# Patient Record
Sex: MALE | Race: BLACK OR AFRICAN AMERICAN | NOT HISPANIC OR LATINO | URBAN - METROPOLITAN AREA
[De-identification: names, ages, dates, MRNs, and addresses within clinical notes are randomized per-mention and may not be internally consistent; named-entity substitution may affect disease eponyms.]

---

## 2023-06-09 ENCOUNTER — HOSPITAL ENCOUNTER (OUTPATIENT)
Dept: RADIOLOGY | Facility: HOSPITAL | Age: 88
Discharge: HOME | End: 2023-06-09
Attending: FAMILY MEDICINE
Payer: MEDICARE

## 2023-06-09 ENCOUNTER — OFFICE VISIT (OUTPATIENT)
Dept: FAMILY MEDICINE | Facility: CLINIC | Age: 88
End: 2023-06-09
Payer: MEDICARE

## 2023-06-09 VITALS
DIASTOLIC BLOOD PRESSURE: 82 MMHG | SYSTOLIC BLOOD PRESSURE: 174 MMHG | RESPIRATION RATE: 18 BRPM | OXYGEN SATURATION: 97 % | HEART RATE: 83 BPM | BODY MASS INDEX: 19.17 KG/M2 | TEMPERATURE: 98 F | HEIGHT: 69 IN | WEIGHT: 129.44 LBS
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
## 2023-06-09 DIAGNOSIS — J43.2 CENTRILOBULAR EMPHYSEMA: ICD-10-CM

## 2023-06-09 DIAGNOSIS — M79.661 PAIN IN RIGHT LOWER LEG: ICD-10-CM

## 2023-06-09 DIAGNOSIS — I10 HYPERTENSION, UNSPECIFIED TYPE: Primary | ICD-10-CM

## 2023-06-09 PROCEDURE — 73590 X-RAY EXAM OF LOWER LEG: CPT | Mod: 26,RT,, | Performed by: RADIOLOGY

## 2023-06-09 PROCEDURE — 99204 PR OFFICE/OUTPT VISIT, NEW, LEVL IV, 45-59 MIN: ICD-10-PCS | Mod: S$GLB,,, | Performed by: FAMILY MEDICINE

## 2023-06-09 PROCEDURE — 1159F PR MEDICATION LIST DOCUMENTED IN MEDICAL RECORD: ICD-10-PCS | Mod: CPTII,S$GLB,, | Performed by: FAMILY MEDICINE

## 2023-06-09 PROCEDURE — 73590 X-RAY EXAM OF LOWER LEG: CPT | Mod: TC,FY,PO,RT

## 2023-06-09 PROCEDURE — 1126F PR PAIN SEVERITY QUANTIFIED, NO PAIN PRESENT: ICD-10-PCS | Mod: CPTII,S$GLB,, | Performed by: FAMILY MEDICINE

## 2023-06-09 PROCEDURE — 1159F MED LIST DOCD IN RCRD: CPT | Mod: CPTII,S$GLB,, | Performed by: FAMILY MEDICINE

## 2023-06-09 PROCEDURE — 99999 PR PBB SHADOW E&M-EST. PATIENT-LVL IV: CPT | Mod: PBBFAC,,, | Performed by: FAMILY MEDICINE

## 2023-06-09 PROCEDURE — 73590 XR TIBIA FIBULA 2 VIEW RIGHT: ICD-10-PCS | Mod: 26,RT,, | Performed by: RADIOLOGY

## 2023-06-09 PROCEDURE — 3288F FALL RISK ASSESSMENT DOCD: CPT | Mod: CPTII,S$GLB,, | Performed by: FAMILY MEDICINE

## 2023-06-09 PROCEDURE — 1100F PR PT FALLS ASSESS DOC 2+ FALLS/FALL W/INJURY/YR: ICD-10-PCS | Mod: CPTII,S$GLB,, | Performed by: FAMILY MEDICINE

## 2023-06-09 PROCEDURE — 99204 OFFICE O/P NEW MOD 45 MIN: CPT | Mod: S$GLB,,, | Performed by: FAMILY MEDICINE

## 2023-06-09 PROCEDURE — 99999 PR PBB SHADOW E&M-EST. PATIENT-LVL IV: ICD-10-PCS | Mod: PBBFAC,,, | Performed by: FAMILY MEDICINE

## 2023-06-09 PROCEDURE — 1126F AMNT PAIN NOTED NONE PRSNT: CPT | Mod: CPTII,S$GLB,, | Performed by: FAMILY MEDICINE

## 2023-06-09 PROCEDURE — 3288F PR FALLS RISK ASSESSMENT DOCUMENTED: ICD-10-PCS | Mod: CPTII,S$GLB,, | Performed by: FAMILY MEDICINE

## 2023-06-09 PROCEDURE — 1100F PTFALLS ASSESS-DOCD GE2>/YR: CPT | Mod: CPTII,S$GLB,, | Performed by: FAMILY MEDICINE

## 2023-06-09 PROCEDURE — 1160F PR REVIEW ALL MEDS BY PRESCRIBER/CLIN PHARMACIST DOCUMENTED: ICD-10-PCS | Mod: CPTII,S$GLB,, | Performed by: FAMILY MEDICINE

## 2023-06-09 PROCEDURE — 1160F RVW MEDS BY RX/DR IN RCRD: CPT | Mod: CPTII,S$GLB,, | Performed by: FAMILY MEDICINE

## 2023-06-09 RX ORDER — LEVOFLOXACIN 750 MG/1
750 TABLET ORAL
COMMUNITY
Start: 2023-01-26 | End: 2023-06-09

## 2023-06-09 RX ORDER — CYPROHEPTADINE HYDROCHLORIDE 4 MG/1
4 TABLET ORAL NIGHTLY
COMMUNITY
Start: 2023-05-23

## 2023-06-09 RX ORDER — IPRATROPIUM BROMIDE 0.5 MG/2.5ML
SOLUTION RESPIRATORY (INHALATION)
COMMUNITY
Start: 2023-04-18

## 2023-06-09 RX ORDER — PANTOPRAZOLE SODIUM 40 MG/1
40 TABLET, DELAYED RELEASE ORAL
COMMUNITY
Start: 2023-05-23

## 2023-06-09 RX ORDER — AMLODIPINE BESYLATE 5 MG/1
5 TABLET ORAL
COMMUNITY
End: 2023-10-06

## 2023-06-09 RX ORDER — ALBUTEROL SULFATE 90 UG/1
2 AEROSOL, METERED RESPIRATORY (INHALATION) EVERY 6 HOURS PRN
COMMUNITY
Start: 2023-04-18

## 2023-06-09 RX ORDER — FLUTICASONE FUROATE, UMECLIDINIUM BROMIDE AND VILANTEROL TRIFENATATE 100; 62.5; 25 UG/1; UG/1; UG/1
1 POWDER RESPIRATORY (INHALATION)
COMMUNITY
Start: 2023-06-05

## 2023-06-09 NOTE — PROGRESS NOTES
Tony Carlos    Chief Complaint   Patient presents with    Establish Care       History of Present Illness:   Mr. Carlos comes in today as a new patient to establish care with me.  Is accompanied by his wife Annie Carlos who assists him with giving information.  He states he has been previously followed by PCP Dr. Chsaity Hawley with last visit approximately 3 months ago.    He states he is not fasting but has taken medications today.  He states he rarely performs home blood pressure checks and notes blood pressure was elevated when last checked.  He states he does not exercise.  He saw Dr. Yang Araya, cardiologist, on January 24, 2023 for surveillance of hypertension, hyperlipidemia, central lobular emphysema with 1-year follow-up advised.      He complains of having chronic shortness of breath.  He saw NP Idalia Godinez with [facility] pulmonology on August 26, 2022 for shortness of breath, central lobular emphysema.  He states he quit smoking cigarettes in 2018.    His wife states he currently uses a friend's walker but requests his own Rollator.  She states he fell 3 weeks ago from a stool as he got up too quickly.  She states he fell onto his right leg (knee) and twisted his right ankle as he tried to move a stool.  He states he did not hit his head.  She states she gave him Biofreeze to applied to his knee and ankle which he only used once.  She states he sits a lot.    He reports no known history of kidney or liver disease.    Otherwise, he denies having fever, chills, fatigue, appetite changes; cough, wheezing; chest pain, palpitations, leg swelling; abdominal pain, nausea, vomiting, diarrhea, constipation; unusual urinary symptoms; polydipsia, polyphagia, polyuria, hot or cold intolerance; back pain; acute visual  changes, numbness, headache; anxiety, depression, homicidal or suicidal thoughts.          Past Medical History:   Diagnosis Date    Centrilobular emphysema     GERD (gastroesophageal  reflux disease)     Hypertension     Mixed hyperlipidemia         Current Outpatient Medications   Medication Sig    albuterol (PROVENTIL/VENTOLIN HFA) 90 mcg/actuation inhaler Inhale 2 puffs into the lungs every 6 (six) hours as needed.    amLODIPine (NORVASC) 5 MG tablet Take 5 mg by mouth.    cyproheptadine (PERIACTIN) 4 mg tablet Take 4 mg by mouth every evening.    ipratropium (ATROVENT) 0.02 % nebulizer solution Take by nebulization.    pantoprazole (PROTONIX) 40 MG tablet Take 40 mg by mouth.    TRELEGY ELLIPTA 100-62.5-25 mcg DsDv Inhale 1 puff into the lungs.       Review of Systems   Constitutional:  Negative for activity change, appetite change, chills, fatigue and fever.   Eyes:  Negative for visual disturbance.   Respiratory:  Positive for shortness of breath. Negative for cough and wheezing.    Cardiovascular:  Negative for chest pain, palpitations and leg swelling.   Gastrointestinal:  Negative for abdominal pain, constipation, diarrhea, nausea and vomiting.   Endocrine: Negative for cold intolerance, heat intolerance, polydipsia, polyphagia and polyuria.   Genitourinary:  Negative for difficulty urinating.   Musculoskeletal:  Positive for myalgias. Negative for arthralgias and back pain.   Neurological:  Negative for numbness and headaches.   Psychiatric/Behavioral:  Negative for dysphoric mood and suicidal ideas. The patient is not nervous/anxious.         Negative for homicidal ideas.     Objective:  Physical Exam  Vitals reviewed.   Constitutional:       General: He is not in acute distress.     Appearance: Normal appearance. He is not ill-appearing, toxic-appearing or diaphoretic.      Comments: Pleasant.   Cardiovascular:      Rate and Rhythm: Normal rate and regular rhythm.      Pulses: Normal pulses.      Heart sounds: No murmur heard.  Pulmonary:      Effort: Pulmonary effort is normal. No respiratory distress.      Breath sounds: Normal breath sounds. No wheezing.   Abdominal:      General:  Bowel sounds are normal. There is no distension.      Palpations: Abdomen is soft. There is no mass.      Tenderness: There is no abdominal tenderness. There is no guarding or rebound.   Musculoskeletal:         General: Tenderness present. No swelling. Normal range of motion.      Cervical back: Normal range of motion and neck supple. No tenderness.      Comments: He is ambulatory with slower pace noted. Tender at right lower leg without swelling, redness, warmth with full range of motion noted.   Lymphadenopathy:      Cervical: No cervical adenopathy.   Skin:     General: Skin is warm.   Neurological:      General: No focal deficit present.      Mental Status: He is alert and oriented to person, place, and time.   Psychiatric:         Mood and Affect: Mood normal.         Behavior: Behavior normal.         Thought Content: Thought content normal.         Judgment: Judgment normal.       ASSESSMENT:  1. Hypertension, unspecified type    2. Pain in right lower leg    3. Centrilobular emphysema    4. Fall, initial encounter        PLAN:  Tony was seen today for establish care.    Diagnoses and all orders for this visit:    Hypertension, unspecified type  -     Comprehensive Metabolic Panel; Future    Pain in right lower leg  -     WALKER FOR HOME USE  -     X-Ray Tibia Fibula 2 View Right; Future    Centrilobular emphysema  -     WALKER FOR HOME USE    Fall, initial encounter  -     X-Ray Tibia Fibula 2 View Right; Future      Patient advised to call for results.  Continue current medications, follow low sodium, low cholesterol, low carb diet, daily walks.  Okay to take over-the-counter extra strength Tylenol 1 to 2 pills twice daily if needed for pain.  Keep follow up with specialists.  Flu shot this fall.  Follow up in about 17 weeks (around 10/6/2023) for hypertension follow up. But, see me sooner if no improvement or worsening symptoms noted.    45 minutes of total time spent on the encounter, which includes  face to face time and non-face to face time preparing to see the patient (eg, review of tests), Obtaining and/or reviewing separately obtained history, Documenting clinical information in the electronic or other health record, Independently interpreting results (not separately reported) and communicating results to the patient/family/caregiver, or Care coordination (not separately reported).      This note is  generated with speech recognition software and is subject to transcription error and sound alike phrases that may be missed by proofreading.

## 2023-06-09 NOTE — PATIENT INSTRUCTIONS
Please call Dr. Maria for your results.    Okay to take over-the-counter extra strength Tylenol 1 to 2 pills twice daily if needed for pain.    Thanks.

## 2023-06-11 PROBLEM — J43.2 CENTRILOBULAR EMPHYSEMA: Status: ACTIVE | Noted: 2023-06-11

## 2023-06-11 PROBLEM — I10 HYPERTENSION: Status: ACTIVE | Noted: 2023-06-11

## 2023-06-13 ENCOUNTER — TELEPHONE (OUTPATIENT)
Dept: FAMILY MEDICINE | Facility: CLINIC | Age: 88
End: 2023-06-13
Payer: MEDICARE

## 2023-06-13 NOTE — TELEPHONE ENCOUNTER
Lab results are okay except low albumin which is related to his nutritional status. Make sure eating. Thanks for call.

## 2023-06-13 NOTE — TELEPHONE ENCOUNTER
----- Message from Jaspreet Wade sent at 6/13/2023 10:50 AM CDT -----  Contact: Annie/Spouse  Annie is needing a call back in regards to the patients lab results. Please give her a call back at [phone]

## 2023-10-06 ENCOUNTER — OFFICE VISIT (OUTPATIENT)
Dept: FAMILY MEDICINE | Facility: CLINIC | Age: 88
End: 2023-10-06
Attending: FAMILY MEDICINE
Payer: MEDICARE

## 2023-10-06 ENCOUNTER — LAB VISIT (OUTPATIENT)
Dept: LAB | Facility: HOSPITAL | Age: 88
End: 2023-10-06
Attending: FAMILY MEDICINE
Payer: MEDICARE

## 2023-10-06 VITALS
SYSTOLIC BLOOD PRESSURE: 128 MMHG | OXYGEN SATURATION: 96 % | WEIGHT: 127.19 LBS | DIASTOLIC BLOOD PRESSURE: 74 MMHG | RESPIRATION RATE: 18 BRPM | HEIGHT: 69 IN | BODY MASS INDEX: 18.84 KG/M2 | HEART RATE: 91 BPM | TEMPERATURE: 97 F

## 2023-10-06 DIAGNOSIS — I10 HYPERTENSION, UNSPECIFIED TYPE: Primary | ICD-10-CM

## 2023-10-06 DIAGNOSIS — J43.2 CENTRILOBULAR EMPHYSEMA: ICD-10-CM

## 2023-10-06 DIAGNOSIS — I10 HYPERTENSION, UNSPECIFIED TYPE: ICD-10-CM

## 2023-10-06 LAB
ALBUMIN SERPL BCP-MCNC: 3.1 G/DL (ref 3.5–5.2)
ALP SERPL-CCNC: 71 U/L (ref 55–135)
ALT SERPL W/O P-5'-P-CCNC: 9 U/L (ref 10–44)
ANION GAP SERPL CALC-SCNC: 9 MMOL/L (ref 8–16)
AST SERPL-CCNC: 20 U/L (ref 10–40)
BACTERIA #/AREA URNS AUTO: ABNORMAL /HPF
BASOPHILS # BLD AUTO: 0.05 K/UL (ref 0–0.2)
BASOPHILS NFR BLD: 0.9 % (ref 0–1.9)
BILIRUB SERPL-MCNC: 0.3 MG/DL (ref 0.1–1)
BILIRUB UR QL STRIP: NEGATIVE
BUN SERPL-MCNC: 16 MG/DL (ref 8–23)
CALCIUM SERPL-MCNC: 9.4 MG/DL (ref 8.7–10.5)
CHLORIDE SERPL-SCNC: 108 MMOL/L (ref 95–110)
CHOLEST SERPL-MCNC: 175 MG/DL (ref 120–199)
CHOLEST/HDLC SERPL: 3 {RATIO} (ref 2–5)
CLARITY UR REFRACT.AUTO: ABNORMAL
CO2 SERPL-SCNC: 27 MMOL/L (ref 23–29)
COLOR UR AUTO: YELLOW
CREAT SERPL-MCNC: 0.9 MG/DL (ref 0.5–1.4)
DIFFERENTIAL METHOD: ABNORMAL
EOSINOPHIL # BLD AUTO: 0.4 K/UL (ref 0–0.5)
EOSINOPHIL NFR BLD: 7.4 % (ref 0–8)
ERYTHROCYTE [DISTWIDTH] IN BLOOD BY AUTOMATED COUNT: 13.7 % (ref 11.5–14.5)
EST. GFR  (NO RACE VARIABLE): >60 ML/MIN/1.73 M^2
GLUCOSE SERPL-MCNC: 59 MG/DL (ref 70–110)
GLUCOSE UR QL STRIP: NEGATIVE
HCT VFR BLD AUTO: 45.1 % (ref 40–54)
HDLC SERPL-MCNC: 59 MG/DL (ref 40–75)
HDLC SERPL: 33.7 % (ref 20–50)
HGB BLD-MCNC: 13.6 G/DL (ref 14–18)
HGB UR QL STRIP: NEGATIVE
HYALINE CASTS UR QL AUTO: 2 /LPF
IMM GRANULOCYTES # BLD AUTO: 0.01 K/UL (ref 0–0.04)
IMM GRANULOCYTES NFR BLD AUTO: 0.2 % (ref 0–0.5)
KETONES UR QL STRIP: ABNORMAL
LDLC SERPL CALC-MCNC: 106.4 MG/DL (ref 63–159)
LEUKOCYTE ESTERASE UR QL STRIP: ABNORMAL
LYMPHOCYTES # BLD AUTO: 2.4 K/UL (ref 1–4.8)
LYMPHOCYTES NFR BLD: 40.9 % (ref 18–48)
MCH RBC QN AUTO: 28.5 PG (ref 27–31)
MCHC RBC AUTO-ENTMCNC: 30.2 G/DL (ref 32–36)
MCV RBC AUTO: 94 FL (ref 82–98)
MICROSCOPIC COMMENT: ABNORMAL
MONOCYTES # BLD AUTO: 0.7 K/UL (ref 0.3–1)
MONOCYTES NFR BLD: 12.7 % (ref 4–15)
NEUTROPHILS # BLD AUTO: 2.2 K/UL (ref 1.8–7.7)
NEUTROPHILS NFR BLD: 37.9 % (ref 38–73)
NITRITE UR QL STRIP: NEGATIVE
NONHDLC SERPL-MCNC: 116 MG/DL
NRBC BLD-RTO: 0 /100 WBC
PH UR STRIP: 5 [PH] (ref 5–8)
PLATELET # BLD AUTO: 292 K/UL (ref 150–450)
PMV BLD AUTO: 10.5 FL (ref 9.2–12.9)
POTASSIUM SERPL-SCNC: 3.8 MMOL/L (ref 3.5–5.1)
PROT SERPL-MCNC: 6.8 G/DL (ref 6–8.4)
PROT UR QL STRIP: ABNORMAL
RBC # BLD AUTO: 4.78 M/UL (ref 4.6–6.2)
RBC #/AREA URNS AUTO: 3 /HPF (ref 0–4)
SODIUM SERPL-SCNC: 144 MMOL/L (ref 136–145)
SP GR UR STRIP: 1.02 (ref 1–1.03)
SQUAMOUS #/AREA URNS AUTO: 1 /HPF
TRIGL SERPL-MCNC: 48 MG/DL (ref 30–150)
TSH SERPL DL<=0.005 MIU/L-ACNC: 1.29 UIU/ML (ref 0.4–4)
URN SPEC COLLECT METH UR: ABNORMAL
WBC # BLD AUTO: 5.84 K/UL (ref 3.9–12.7)
WBC #/AREA URNS AUTO: 77 /HPF (ref 0–5)

## 2023-10-06 PROCEDURE — 3288F PR FALLS RISK ASSESSMENT DOCUMENTED: ICD-10-PCS | Mod: CPTII,S$GLB,, | Performed by: FAMILY MEDICINE

## 2023-10-06 PROCEDURE — 1160F PR REVIEW ALL MEDS BY PRESCRIBER/CLIN PHARMACIST DOCUMENTED: ICD-10-PCS | Mod: CPTII,S$GLB,, | Performed by: FAMILY MEDICINE

## 2023-10-06 PROCEDURE — 99214 PR OFFICE/OUTPT VISIT, EST, LEVL IV, 30-39 MIN: ICD-10-PCS | Mod: 25,S$GLB,, | Performed by: FAMILY MEDICINE

## 2023-10-06 PROCEDURE — 1126F AMNT PAIN NOTED NONE PRSNT: CPT | Mod: CPTII,S$GLB,, | Performed by: FAMILY MEDICINE

## 2023-10-06 PROCEDURE — 1159F MED LIST DOCD IN RCRD: CPT | Mod: CPTII,S$GLB,, | Performed by: FAMILY MEDICINE

## 2023-10-06 PROCEDURE — 90694 VACC AIIV4 NO PRSRV 0.5ML IM: CPT | Mod: S$GLB,,, | Performed by: FAMILY MEDICINE

## 2023-10-06 PROCEDURE — 1160F RVW MEDS BY RX/DR IN RCRD: CPT | Mod: CPTII,S$GLB,, | Performed by: FAMILY MEDICINE

## 2023-10-06 PROCEDURE — 1101F PT FALLS ASSESS-DOCD LE1/YR: CPT | Mod: CPTII,S$GLB,, | Performed by: FAMILY MEDICINE

## 2023-10-06 PROCEDURE — G0008 FLU VACCINE - QUADRIVALENT - ADJUVANTED: ICD-10-PCS | Mod: S$GLB,,, | Performed by: FAMILY MEDICINE

## 2023-10-06 PROCEDURE — 1159F PR MEDICATION LIST DOCUMENTED IN MEDICAL RECORD: ICD-10-PCS | Mod: CPTII,S$GLB,, | Performed by: FAMILY MEDICINE

## 2023-10-06 PROCEDURE — 99999 PR PBB SHADOW E&M-EST. PATIENT-LVL IV: ICD-10-PCS | Mod: PBBFAC,,, | Performed by: FAMILY MEDICINE

## 2023-10-06 PROCEDURE — 36415 COLL VENOUS BLD VENIPUNCTURE: CPT | Mod: PO | Performed by: FAMILY MEDICINE

## 2023-10-06 PROCEDURE — 84443 ASSAY THYROID STIM HORMONE: CPT | Performed by: FAMILY MEDICINE

## 2023-10-06 PROCEDURE — 80053 COMPREHEN METABOLIC PANEL: CPT | Performed by: FAMILY MEDICINE

## 2023-10-06 PROCEDURE — 99999 PR PBB SHADOW E&M-EST. PATIENT-LVL IV: CPT | Mod: PBBFAC,,, | Performed by: FAMILY MEDICINE

## 2023-10-06 PROCEDURE — 3288F FALL RISK ASSESSMENT DOCD: CPT | Mod: CPTII,S$GLB,, | Performed by: FAMILY MEDICINE

## 2023-10-06 PROCEDURE — 1126F PR PAIN SEVERITY QUANTIFIED, NO PAIN PRESENT: ICD-10-PCS | Mod: CPTII,S$GLB,, | Performed by: FAMILY MEDICINE

## 2023-10-06 PROCEDURE — G0008 ADMIN INFLUENZA VIRUS VAC: HCPCS | Mod: S$GLB,,, | Performed by: FAMILY MEDICINE

## 2023-10-06 PROCEDURE — 1101F PR PT FALLS ASSESS DOC 0-1 FALLS W/OUT INJ PAST YR: ICD-10-PCS | Mod: CPTII,S$GLB,, | Performed by: FAMILY MEDICINE

## 2023-10-06 PROCEDURE — 85025 COMPLETE CBC W/AUTO DIFF WBC: CPT | Performed by: FAMILY MEDICINE

## 2023-10-06 PROCEDURE — 99214 OFFICE O/P EST MOD 30 MIN: CPT | Mod: 25,S$GLB,, | Performed by: FAMILY MEDICINE

## 2023-10-06 PROCEDURE — 80061 LIPID PANEL: CPT | Performed by: FAMILY MEDICINE

## 2023-10-06 PROCEDURE — 81001 URINALYSIS AUTO W/SCOPE: CPT | Performed by: FAMILY MEDICINE

## 2023-10-06 PROCEDURE — 90694 FLU VACCINE - QUADRIVALENT - ADJUVANTED: ICD-10-PCS | Mod: S$GLB,,, | Performed by: FAMILY MEDICINE

## 2023-10-06 RX ORDER — VALSARTAN 80 MG/1
80 TABLET ORAL
Status: CANCELLED | OUTPATIENT
Start: 2023-10-06

## 2023-10-06 RX ORDER — VALSARTAN 80 MG/1
80 TABLET ORAL DAILY
COMMUNITY
Start: 2023-08-13 | End: 2023-10-06 | Stop reason: SDUPTHER

## 2023-10-06 RX ORDER — VALSARTAN 80 MG/1
80 TABLET ORAL DAILY
Qty: 90 TABLET | Refills: 2 | Status: SHIPPED | OUTPATIENT
Start: 2023-10-06

## 2023-10-06 NOTE — PROGRESS NOTES
Tony DENISE Carlos    Chief Complaint   Patient presents with    Hypertension    Follow-up       History of Present Illness:   Mr. Carlos comes in today for hypertension follow up.  He is accompanied by his wife Annie Carlos who assists him with giving information.  He states he is not fasting but has taken blood pressure medication today.  He states he monitors his diet but does not exercise.  He states he performs home blood pressure checks once every 2 weeks with levels ranging 140's/70-80's. Ms. Carlos states he needs prescription refill for valsartan to be sent to mail order.  She states (and confirmed by nurse conversation with pharmacist today) Mr. Carlos has only been taking valsartan 80 mg daily and not amlodipine 5 mg daily.     He complains of having chronic shortness of breath, fatigue with walking distances and must stop to rest before walking again.  He saw KAYLA Godinez with [facility] pulmonology on August 29, 2023 for shortness of breath, central lobular emphysema with 1- year follow up advised.  He states he quit smoking cigarettes in 2018.    He saw Dr. Yang Araya, cardiologist, on January 24, 2023 for surveillance of hypertension, hyperlipidemia, central lobular emphysema with 1-year follow-up advised.       Otherwise, he denies having fever, chills, fatigue, appetite changes; cough, wheezing; chest pain, palpitations, leg swelling; abdominal pain, nausea, vomiting, diarrhea, constipation; unusual urinary symptoms; polydipsia, polyphagia, polyuria, hot or cold intolerance; back pain; acute visual  changes, numbness, headache; anxiety, depression, homicidal or suicidal thoughts.     Labs:                   ALT                      17                  06/09/2023                 AST                      21                  06/09/2023                 NA                       143                 06/09/2023                 K                        4.5                 06/09/2023                 CL                        109                 06/09/2023                 CREATININE               1.1                 06/09/2023                 BUN                      16                  06/09/2023                 CO2                      30 (H)              06/09/2023                  Current Outpatient Medications   Medication Sig    albuterol (PROVENTIL/VENTOLIN HFA) 90 mcg/actuation inhaler Inhale 2 puffs into the lungs every 6 (six) hours as needed.    cyproheptadine (PERIACTIN) 4 mg tablet Take 4 mg by mouth every evening.    ipratropium (ATROVENT) 0.02 % nebulizer solution Take by nebulization.    pantoprazole (PROTONIX) 40 MG tablet Take 40 mg by mouth.    TRELEGY ELLIPTA 100-62.5-25 mcg DsDv Inhale 1 puff into the lungs.    valsartan (DIOVAN) 80 MG tablet Take 80 mg by mouth once daily.       Review of Systems   Constitutional:  Positive for fatigue. Negative for activity change, appetite change, chills and fever.        Weight 58.7 kg (129 lb 6.6 oz) at June 9, 2023 visit.   Eyes:  Negative for visual disturbance.   Respiratory:  Positive for shortness of breath. Negative for cough and wheezing.         See history of present illness.   Cardiovascular:  Negative for chest pain, palpitations and leg swelling.        See history of present illness.   Gastrointestinal:  Negative for abdominal pain, constipation, diarrhea, nausea and vomiting.   Endocrine: Negative for cold intolerance, heat intolerance, polydipsia, polyphagia and polyuria.   Genitourinary:  Negative for difficulty urinating.   Musculoskeletal:  Negative for arthralgias, back pain and myalgias.   Neurological:  Negative for numbness and headaches.   Psychiatric/Behavioral:  Negative for dysphoric mood and suicidal ideas. The patient is not nervous/anxious.         Negative for homicidal ideas.       Objective:  Physical Exam  Vitals reviewed.   Constitutional:       General: He is not in acute distress.     Appearance: Normal appearance. He is  not ill-appearing, toxic-appearing or diaphoretic.      Comments: Pleasant.   Cardiovascular:      Rate and Rhythm: Normal rate and regular rhythm.      Pulses: Normal pulses.      Heart sounds: No murmur heard.  Pulmonary:      Effort: Pulmonary effort is normal. No respiratory distress.      Breath sounds: Normal breath sounds. No wheezing.   Abdominal:      General: Bowel sounds are normal. There is no distension.      Palpations: Abdomen is soft. There is no mass.      Tenderness: There is no abdominal tenderness. There is no guarding or rebound.   Musculoskeletal:         General: No swelling or tenderness. Normal range of motion.      Cervical back: Normal range of motion and neck supple. No tenderness.      Comments: He is ambulatory with slower pace noted.   Lymphadenopathy:      Cervical: No cervical adenopathy.   Skin:     General: Skin is warm.   Neurological:      General: No focal deficit present.      Mental Status: He is alert and oriented to person, place, and time.   Psychiatric:         Mood and Affect: Mood normal.         Behavior: Behavior normal.         Thought Content: Thought content normal.         Judgment: Judgment normal.         ASSESSMENT:  1. Hypertension, unspecified type    2. Centrilobular emphysema        PLAN:  Tony was seen today for hypertension and follow-up.    Diagnoses and all orders for this visit:    Hypertension, unspecified type  -     Lipid Panel; Future  -     Comprehensive Metabolic Panel; Future  -     TSH; Future  -     CBC Auto Differential; Future  -     Urinalysis  -     valsartan (DIOVAN) 80 MG tablet; Take 1 tablet (80 mg total) by mouth once daily.    Centrilobular emphysema      Patient advised to call for results.  Prescription refills as noted above.  Keep follow up with specialists.  Flu shot will be given today.  Follow up in about 26 weeks (around 4/5/2024) for hypertension follow up.    30 minutes of total time spent on the encounter, which includes  face to face time and non-face to face time preparing to see the patient (eg, review of tests), Obtaining and/or reviewing separately obtained history, Documenting clinical information in the electronic or other health record, Independently interpreting results (not separately reported) and communicating results to the patient/family/caregiver, or Care coordination (not separately reported).      This note is  generated with speech recognition software and is subject to transcription error and sound alike phrases that may be missed by proofreading.

## 2023-10-11 ENCOUNTER — TELEPHONE (OUTPATIENT)
Dept: FAMILY MEDICINE | Facility: CLINIC | Age: 88
End: 2023-10-11
Payer: MEDICARE

## 2023-10-11 NOTE — TELEPHONE ENCOUNTER
----- Message from Cherry Ocampo sent at 10/11/2023  1:37 PM CDT -----  .Type:  Test Results    Who Called: Pt's wife  Name of Test (Lab/Mammo/Etc): blood work  Date of Test: 10/06/2023  Ordering Provider: Crow  Where the test was performed: [facility]  Would the patient rather a call back or a response via [facility]? Call back  Best Call Back Number: [phone]  Additional Information:

## 2023-10-16 ENCOUNTER — TELEPHONE (OUTPATIENT)
Dept: FAMILY MEDICINE | Facility: CLINIC | Age: 88
End: 2023-10-16
Payer: MEDICARE

## 2023-10-16 DIAGNOSIS — N39.0 URINARY TRACT INFECTION WITHOUT HEMATURIA, SITE UNSPECIFIED: Primary | ICD-10-CM

## 2023-10-16 RX ORDER — DOXYCYCLINE HYCLATE 100 MG
100 TABLET ORAL EVERY 12 HOURS
Qty: 14 TABLET | Refills: 0 | Status: SHIPPED | OUTPATIENT
Start: 2023-10-16 | End: 2023-10-23

## 2023-10-16 NOTE — TELEPHONE ENCOUNTER
Patient wife was informed of his results information and verbally understood the information given.

## 2023-10-16 NOTE — TELEPHONE ENCOUNTER
----- Message from Shani Briceño sent at 10/16/2023 12:12 PM CDT -----  Contact: Annie/Spouse  Type:  Test Results    Who Called: Annie  Name of Test (Lab/Mammo/Etc): Blood Test  Date of Test: 10/06/23  Ordering Provider: Dr. Maria  Where the test was performed: [facility]  Would the patient rather a call back or a response via [facility]? Call  Best Call Back Number: [phone]   Additional Information:  Requesting to be called about results.  Thank you,  TH

## 2023-10-16 NOTE — TELEPHONE ENCOUNTER
Advise patient lab results are within acceptable range except abnormal urinalysis.  Prescription sent to local pharmacy for treatment.  Thanks for call.    I have put the following orders and/or medications to this note.  Please advise pt and assist.    No orders of the defined types were placed in this encounter.      Medications Ordered This Encounter   Medications    doxycycline (VIBRA-TABS) 100 MG tablet     Sig: Take 1 tablet (100 mg total) by mouth every 12 (twelve) hours. for 7 days     Dispense:  14 tablet     Refill:  0

## 2023-10-20 ENCOUNTER — OFFICE VISIT (OUTPATIENT)
Dept: FAMILY MEDICINE | Facility: CLINIC | Age: 88
End: 2023-10-20
Payer: MEDICARE

## 2023-10-20 VITALS
OXYGEN SATURATION: 90 % | WEIGHT: 128.75 LBS | SYSTOLIC BLOOD PRESSURE: 186 MMHG | HEART RATE: 99 BPM | TEMPERATURE: 98 F | BODY MASS INDEX: 19.07 KG/M2 | DIASTOLIC BLOOD PRESSURE: 100 MMHG | HEIGHT: 69 IN

## 2023-10-20 DIAGNOSIS — J43.2 CENTRILOBULAR EMPHYSEMA: ICD-10-CM

## 2023-10-20 DIAGNOSIS — I10 HYPERTENSION, UNSPECIFIED TYPE: Primary | ICD-10-CM

## 2023-10-20 PROCEDURE — 99999 PR PBB SHADOW E&M-EST. PATIENT-LVL III: ICD-10-PCS | Mod: PBBFAC,,, | Performed by: NURSE PRACTITIONER

## 2023-10-20 PROCEDURE — 1159F MED LIST DOCD IN RCRD: CPT | Mod: CPTII,S$GLB,, | Performed by: NURSE PRACTITIONER

## 2023-10-20 PROCEDURE — 1126F PR PAIN SEVERITY QUANTIFIED, NO PAIN PRESENT: ICD-10-PCS | Mod: CPTII,S$GLB,, | Performed by: NURSE PRACTITIONER

## 2023-10-20 PROCEDURE — 3288F FALL RISK ASSESSMENT DOCD: CPT | Mod: CPTII,S$GLB,, | Performed by: NURSE PRACTITIONER

## 2023-10-20 PROCEDURE — 1159F PR MEDICATION LIST DOCUMENTED IN MEDICAL RECORD: ICD-10-PCS | Mod: CPTII,S$GLB,, | Performed by: NURSE PRACTITIONER

## 2023-10-20 PROCEDURE — 99214 PR OFFICE/OUTPT VISIT, EST, LEVL IV, 30-39 MIN: ICD-10-PCS | Mod: S$GLB,,, | Performed by: NURSE PRACTITIONER

## 2023-10-20 PROCEDURE — 99214 OFFICE O/P EST MOD 30 MIN: CPT | Mod: S$GLB,,, | Performed by: NURSE PRACTITIONER

## 2023-10-20 PROCEDURE — 1126F AMNT PAIN NOTED NONE PRSNT: CPT | Mod: CPTII,S$GLB,, | Performed by: NURSE PRACTITIONER

## 2023-10-20 PROCEDURE — 1101F PT FALLS ASSESS-DOCD LE1/YR: CPT | Mod: CPTII,S$GLB,, | Performed by: NURSE PRACTITIONER

## 2023-10-20 PROCEDURE — 3288F PR FALLS RISK ASSESSMENT DOCUMENTED: ICD-10-PCS | Mod: CPTII,S$GLB,, | Performed by: NURSE PRACTITIONER

## 2023-10-20 PROCEDURE — 99999 PR PBB SHADOW E&M-EST. PATIENT-LVL III: CPT | Mod: PBBFAC,,, | Performed by: NURSE PRACTITIONER

## 2023-10-20 PROCEDURE — 1101F PR PT FALLS ASSESS DOC 0-1 FALLS W/OUT INJ PAST YR: ICD-10-PCS | Mod: CPTII,S$GLB,, | Performed by: NURSE PRACTITIONER

## 2023-10-20 PROCEDURE — 1160F RVW MEDS BY RX/DR IN RCRD: CPT | Mod: CPTII,S$GLB,, | Performed by: NURSE PRACTITIONER

## 2023-10-20 PROCEDURE — 1160F PR REVIEW ALL MEDS BY PRESCRIBER/CLIN PHARMACIST DOCUMENTED: ICD-10-PCS | Mod: CPTII,S$GLB,, | Performed by: NURSE PRACTITIONER

## 2023-10-20 NOTE — PROGRESS NOTES
Tony Carlos  10/20/2023  [id number]    Beatris Maria MD  Patient Care Team:  Beatris aMria MD as PCP - General (Family Medicine)          Visit Type:an urgent visit for a new problem    Chief Complaint:  Chief Complaint   Patient presents with    Hypertension       History of Present Illness:  87 yo male presents today with a complaint of elevated blood pressure today.  Pt was seen by GI this am and sent for follow up due to elevated blood pressure.   Pt states he did not take his BP meds this am because he had an early appointment an didn't have time to eat breakfast and he doesn't take his meds on an empty stomach.  Denies HA, visual changes, CP, SOB, dizziness, numbness, weakness or fatigue   History:  Past Medical History:   Diagnosis Date    Centrilobular emphysema     GERD (gastroesophageal reflux disease)     Hypertension     Mixed hyperlipidemia      Past Surgical History:   Procedure Laterality Date    FOOT SURGERY Right     s/p fracture     Family History   Problem Relation Age of Onset    Emphysema Mother         smoker    Emphysema Father         smoker    No Known Problems Sister     No Known Problems Sister     No Known Problems Brother     No Known Problems Son     No Known Problems Son     No Known Problems Son     No Known Problems Daughter     No Known Problems Daughter      Social History     Socioeconomic History    Marital status:    Tobacco Use    Smoking status: Former     Current packs/day: 0.00     Types: Cigarettes     Quit date: 2018     Years since quittin.8    Smokeless tobacco: Never   Substance and Sexual Activity    Alcohol use: Never    Drug use: Never    Sexual activity: Not Currently     Partners: Female   Social History Narrative    Retired  (including ).     Social Determinants of Health     Physical Activity: Inactive (10/6/2023)    Exercise Vital Sign     Days of Exercise per Week: 0 days     Minutes of Exercise per Session: 0  min     Patient Active Problem List   Diagnosis    Hypertension    Centrilobular emphysema     Review of patient's allergies indicates:  No Known Allergies    The following were reviewed at this visit: active problem list, medication list, allergies, family history, social history, and health maintenance.    Medications:  Current Outpatient Medications on File Prior to Visit   Medication Sig Dispense Refill    albuterol (PROVENTIL/VENTOLIN HFA) 90 mcg/actuation inhaler Inhale 2 puffs into the lungs every 6 (six) hours as needed.      cyproheptadine (PERIACTIN) 4 mg tablet Take 4 mg by mouth every evening.      doxycycline (VIBRA-TABS) 100 MG tablet Take 1 tablet (100 mg total) by mouth every 12 (twelve) hours. for 7 days 14 tablet 0    ipratropium (ATROVENT) 0.02 % nebulizer solution Take by nebulization.      pantoprazole (PROTONIX) 40 MG tablet Take 40 mg by mouth.      TRELEGY ELLIPTA 100-62.5-25 mcg DsDv Inhale 1 puff into the lungs.      valsartan (DIOVAN) 80 MG tablet Take 1 tablet (80 mg total) by mouth once daily. 90 tablet 2     No current facility-administered medications on file prior to visit.       Medications have been reviewed and reconciled with patient at this visit.  Barriers to medications reviewed with patient.    Adverse reactions to current medications reviewed with patient..    Over the counter medications reviewed and reconciled with patient.    Exam:  Wt Readings from Last 3 Encounters:   10/20/23 58.4 kg (128 lb 12 oz)   10/06/23 57.7 kg (127 lb 3.3 oz)   06/09/23 58.7 kg (129 lb 6.6 oz)     Temp Readings from Last 3 Encounters:   10/20/23 97.8 °F (36.6 °C)   10/06/23 97.1 °F (36.2 °C) (Tympanic)   06/09/23 98.2 °F (36.8 °C)     BP Readings from Last 3 Encounters:   10/20/23 (!) 186/100   10/06/23 128/74   06/09/23 (!) 174/82     Pulse Readings from Last 3 Encounters:   10/20/23 99   10/06/23 91   06/09/23 83     Body mass index is 19.01 kg/m².      Review of Systems   Constitutional:   Negative for fever.   Respiratory:  Negative for cough, shortness of breath and wheezing.    Cardiovascular:  Negative for chest pain and palpitations.   Gastrointestinal:  Negative for nausea.   Neurological:  Negative for speech change, weakness and headaches.   All other systems reviewed and are negative.    Physical Exam  Vitals and nursing note reviewed.   Constitutional:       Appearance: Normal appearance. He is normal weight.   HENT:      Head: Normocephalic and atraumatic.      Right Ear: Tympanic membrane, ear canal and external ear normal.      Left Ear: Tympanic membrane, ear canal and external ear normal.      Nose: Nose normal.      Mouth/Throat:      Mouth: Mucous membranes are moist.      Pharynx: Oropharynx is clear.   Eyes:      Extraocular Movements: Extraocular movements intact.      Conjunctiva/sclera: Conjunctivae normal.      Pupils: Pupils are equal, round, and reactive to light.   Cardiovascular:      Rate and Rhythm: Normal rate and regular rhythm.      Pulses: Normal pulses.      Heart sounds: Normal heart sounds.   Pulmonary:      Effort: Pulmonary effort is normal.      Breath sounds: Normal breath sounds.   Abdominal:      General: Bowel sounds are normal.      Palpations: Abdomen is soft.   Musculoskeletal:         General: Normal range of motion.      Cervical back: Normal range of motion and neck supple.   Skin:     General: Skin is warm and dry.      Capillary Refill: Capillary refill takes less than 2 seconds.   Neurological:      General: No focal deficit present.      Mental Status: He is alert and oriented to person, place, and time.   Psychiatric:         Mood and Affect: Mood normal.         Behavior: Behavior normal.         Thought Content: Thought content normal.         Judgment: Judgment normal.         Laboratory Reviewed ({Yes)  Lab Results   Component Value Date    WBC 5.84 10/06/2023    HGB 13.6 (L) 10/06/2023    HCT 45.1 10/06/2023     10/06/2023    CHOL 175  10/06/2023    TRIG 48 10/06/2023    HDL 59 10/06/2023    ALT 9 (L) 10/06/2023    AST 20 10/06/2023     10/06/2023    K 3.8 10/06/2023     10/06/2023    CREATININE 0.9 10/06/2023    BUN 16 10/06/2023    CO2 27 10/06/2023    TSH 1.290 10/06/2023       Tony was seen today for hypertension.    Diagnoses and all orders for this visit:    Hypertension, unspecified type    Centrilobular emphysema          The current medical regimen is effective;  continue present plan and medications.     Care Plan/Goals: Reviewed    Goals    None     Tony was seen today for hypertension.    Diagnoses and all orders for this visit:    Hypertension, unspecified type    Centrilobular emphysema         Follow up: Follow up in about 3 months (around 1/20/2024) for with  for 3 month follow up.    After visit summary was printed and given to patient upon discharge today.  Patient goals and care plan are included in After Visit Summary.

## 2023-10-30 NOTE — TELEPHONE ENCOUNTER
No care due was identified.  [facility] Embedded Care Due Messages. Reference number: [id number].   10/30/2023 6:01:29 PM CDT

## 2023-10-31 RX ORDER — BLOOD-GLUCOSE METER
EACH MISCELLANEOUS
Qty: 1 EACH | Refills: 0 | OUTPATIENT
Start: 2023-10-31

## 2023-10-31 RX ORDER — LANCETS 28 GAUGE
EACH MISCELLANEOUS
Qty: 100 EACH | Refills: 0 | OUTPATIENT
Start: 2023-10-31

## 2023-10-31 RX ORDER — DEXTROSE 4 G
TABLET,CHEWABLE ORAL
Refills: 0 | OUTPATIENT
Start: 2023-10-31

## 2023-10-31 NOTE — TELEPHONE ENCOUNTER
Refill Decision Note   Tony Terrance  is requesting a refill authorization.  Brief Assessment and Rationale for Refill:  Quick Discontinue     Medication Therapy Plan: Pharmacy is requesting new scripts for the following medications without required information, (sig/ frequency/qty/etc)     Medication Reconciliation Completed: No   Comments:     No Care Gaps recommended.     Note composed:4:40 AM 10/31/2023

## 2024-02-05 ENCOUNTER — OFFICE VISIT (OUTPATIENT)
Dept: FAMILY MEDICINE | Facility: CLINIC | Age: 89
End: 2024-02-05
Attending: FAMILY MEDICINE
Payer: MEDICARE

## 2024-02-05 VITALS
HEART RATE: 65 BPM | RESPIRATION RATE: 18 BRPM | TEMPERATURE: 97 F | HEIGHT: 69 IN | DIASTOLIC BLOOD PRESSURE: 80 MMHG | OXYGEN SATURATION: 95 % | SYSTOLIC BLOOD PRESSURE: 135 MMHG | WEIGHT: 127.19 LBS | BODY MASS INDEX: 18.84 KG/M2

## 2024-02-05 DIAGNOSIS — J43.2 CENTRILOBULAR EMPHYSEMA: ICD-10-CM

## 2024-02-05 DIAGNOSIS — I10 HYPERTENSION, UNSPECIFIED TYPE: Primary | ICD-10-CM

## 2024-02-05 DIAGNOSIS — E78.2 MIXED HYPERLIPIDEMIA: ICD-10-CM

## 2024-02-05 DIAGNOSIS — K21.9 GASTROESOPHAGEAL REFLUX DISEASE, UNSPECIFIED WHETHER ESOPHAGITIS PRESENT: ICD-10-CM

## 2024-02-05 PROCEDURE — 99214 OFFICE O/P EST MOD 30 MIN: CPT | Mod: S$GLB,,, | Performed by: FAMILY MEDICINE

## 2024-02-05 PROCEDURE — 1126F AMNT PAIN NOTED NONE PRSNT: CPT | Mod: CPTII,S$GLB,, | Performed by: FAMILY MEDICINE

## 2024-02-05 PROCEDURE — 1159F MED LIST DOCD IN RCRD: CPT | Mod: CPTII,S$GLB,, | Performed by: FAMILY MEDICINE

## 2024-02-05 PROCEDURE — 1160F RVW MEDS BY RX/DR IN RCRD: CPT | Mod: CPTII,S$GLB,, | Performed by: FAMILY MEDICINE

## 2024-02-05 PROCEDURE — 99999 PR PBB SHADOW E&M-EST. PATIENT-LVL III: CPT | Mod: PBBFAC,,, | Performed by: FAMILY MEDICINE

## 2024-02-05 NOTE — PROGRESS NOTES
Tony Carlos    Chief Complaint   Patient presents with    Hypertension    Follow-up       History of Present Illness:   Mr. Carlos comes in today for hypertension follow up.  He is accompanied by his wife Annie Carlos who assists him with giving information.  He states he is not fasting but has taken blood pressure medication today.  He states he monitors his diet but does not exercise.  He states he performs home blood pressure checks once every 2 weeks with levels ranging 140's/70-80's. He states he continues to take Valsartan 80 mg daily for blood pressure control without.  He also states he takes Periactin 4 mg daily for appetite stimulant and takes Protonix 40 mg daily for reflux.     He complains of having chronic shortness of breath with walking distances and must stop to rest before walking again.  He states he uses Trelegy Ellipta and albuterol MDI with help for shortness of breath.  He saw KAYLA Godinez with pulmonary on August 29, 2023 for centrilobular emphysema.  He states he quit smoking cigarettes in 2018.    He saw Dr. Araya, cardiologist, on January 18, 2024 for primary hypertension, mixed hyperlipidemia, centrilobular emphysema with 1-year follow up advised.     Otherwise, he denies having fever, chills, fatigue, appetite changes; cough, wheezing; chest pain, palpitations, leg swelling; abdominal pain, nausea, vomiting, diarrhea, constipation; unusual urinary symptoms; polydipsia, polyphagia, polyuria, hot or cold intolerance; back pain; acute visual changes, numbness, headache; anxiety, depression, homicidal or suicidal thoughts.           Labs:                   WBC                      5.84                10/06/2023                 HGB                      13.6 (L)            10/06/2023                 HCT                      45.1                10/06/2023                 PLT                      292                 10/06/2023                 CHOL                     175                  10/06/2023                 TRIG                     48                  10/06/2023                 HDL                      59                  10/06/2023                 ALT                      9 (L)               10/06/2023                 AST                      20                  10/06/2023                 NA                       144                 10/06/2023                 K                        3.8                 10/06/2023                 CL                       108                 10/06/2023                 CREATININE               0.9                 10/06/2023                 BUN                      16                  10/06/2023                 CO2                      27                  10/06/2023                 TSH                      1.290               10/06/2023               LDLCALC                  106.4               10/06/2023                  Current Outpatient Medications   Medication Sig    albuterol (PROVENTIL/VENTOLIN HFA) 90 mcg/actuation inhaler Inhale 2 puffs into the lungs every 6 (six) hours as needed.    cyproheptadine (PERIACTIN) 4 mg tablet Take 4 mg by mouth every evening.    ipratropium (ATROVENT) 0.02 % nebulizer solution Take by nebulization.    pantoprazole (PROTONIX) 40 MG tablet Take 40 mg by mouth.    TRELEGY ELLIPTA 100-62.5-25 mcg DsDv Inhale 1 puff into the lungs.    valsartan (DIOVAN) 80 MG tablet Take 1 tablet (80 mg total) by mouth once daily.       Review of Systems   Constitutional:  Negative for activity change, appetite change, chills, fatigue and fever.        Weight 58.4 kg (128 lb 12 oz) at October 20, 2023 visit.   Eyes:  Negative for visual disturbance.   Respiratory:  Positive for shortness of breath. Negative for cough and wheezing.    Cardiovascular:  Negative for chest pain, palpitations and leg swelling.   Gastrointestinal:  Negative for abdominal pain, constipation, diarrhea, nausea and vomiting.   Endocrine: Negative for cold intolerance,  heat intolerance, polydipsia, polyphagia and polyuria.   Genitourinary:  Negative for difficulty urinating.   Musculoskeletal:  Negative for arthralgias, back pain and myalgias.   Neurological:  Negative for numbness and headaches.   Psychiatric/Behavioral:  Negative for dysphoric mood and suicidal ideas. The patient is not nervous/anxious.         Negative for homicidal ideas.       Objective:  Physical Exam  Vitals reviewed.   Constitutional:       General: He is not in acute distress.     Appearance: Normal appearance. He is not ill-appearing, toxic-appearing or diaphoretic.      Comments: Pleasant.   Cardiovascular:      Rate and Rhythm: Normal rate and regular rhythm.      Pulses: Normal pulses.      Heart sounds: No murmur heard.  Pulmonary:      Effort: Pulmonary effort is normal. No respiratory distress.      Breath sounds: Normal breath sounds. No wheezing.   Abdominal:      General: Bowel sounds are normal. There is no distension.      Palpations: Abdomen is soft. There is no mass.      Tenderness: There is no abdominal tenderness. There is no guarding or rebound.   Musculoskeletal:         General: No swelling or tenderness. Normal range of motion.      Cervical back: Normal range of motion and neck supple. No tenderness.      Comments: He is ambulatory with (chronic) slower pace noted.   Lymphadenopathy:      Cervical: No cervical adenopathy.   Skin:     General: Skin is warm.   Neurological:      General: No focal deficit present.      Mental Status: He is alert and oriented to person, place, and time.   Psychiatric:         Mood and Affect: Mood normal.         Behavior: Behavior normal.         Thought Content: Thought content normal.         Judgment: Judgment normal.       ASSESSMENT:  1. Hypertension, unspecified type    2. Mixed hyperlipidemia    3. Centrilobular emphysema    4. Gastroesophageal reflux disease, unspecified whether esophagitis present        PLAN:  Tony was seen today for  hypertension and follow-up.    Diagnoses and all orders for this visit:    Hypertension, unspecified type - stable on medication.    Mixed hyperlipidemia - stable on no medication.    Centrilobular emphysema - stable with medication/managed by pulmonary.    Gastroesophageal reflux disease, unspecified whether esophagitis present - stable on medication.      No labs today.  Continue current medications, follow low sodium, low cholesterol, low carb diet, daily walks.  Keep follow up with specialists.  RSV advised and available at local pharmacy.  Follow up in about 6 months (around 8/9/2024) for physical (w/labs).

## 2024-02-12 NOTE — TELEPHONE ENCOUNTER
Addended by: MIAN LICEA on: 6/2/2020 10:15 AM     Modules accepted: Orders     Please see the attached refill request.

## 2024-02-13 RX ORDER — IPRATROPIUM BROMIDE 0.5 MG/2.5ML
SOLUTION RESPIRATORY (INHALATION)
Qty: 300 EACH | Refills: 3 | OUTPATIENT
Start: 2024-02-13

## 2024-02-13 RX ORDER — ALBUTEROL SULFATE 0.83 MG/ML
SOLUTION RESPIRATORY (INHALATION)
Refills: 3 | OUTPATIENT
Start: 2024-02-13

## 2024-02-13 NOTE — TELEPHONE ENCOUNTER
Advise pt his lung specialist prescribes medications; so, refill requests should be sent to lung specialist at [facility].

## 2024-03-10 NOTE — TELEPHONE ENCOUNTER
No care due was identified.  Health [facility] Embedded Care Due Messages. Reference number: [id number].   3/10/2024 4:47:08 AM CDT

## 2024-03-11 NOTE — TELEPHONE ENCOUNTER
Refill Routing Note   Medication(s) are not appropriate for processing by [facility] for the following reason(s):        No active prescription written by provider    [facility] action(s):  Defer               Appointments  past 12m or future 3m with PCP    Date Provider   Last Visit   Visit date not found Chasity Iniguez MD   Next Visit   Visit date not found Chasity Iniguez MD   ED visits in past 90 days: 0        Note composed:11:36 AM 03/11/2024

## 2024-03-13 RX ORDER — PANTOPRAZOLE SODIUM 40 MG/1
TABLET, DELAYED RELEASE ORAL
Qty: 90 TABLET | Refills: 3 | OUTPATIENT
Start: 2024-03-13

## 2024-03-13 RX ORDER — CYPROHEPTADINE HYDROCHLORIDE 4 MG/1
TABLET ORAL
Qty: 90 TABLET | Refills: 3 | OUTPATIENT
Start: 2024-03-13

## 2024-04-30 DIAGNOSIS — Z00.00 ENCOUNTER FOR MEDICARE ANNUAL WELLNESS EXAM: ICD-10-CM

## 2024-05-29 DIAGNOSIS — I10 HYPERTENSION, UNSPECIFIED TYPE: ICD-10-CM

## 2024-05-29 RX ORDER — VALSARTAN 80 MG/1
80 TABLET ORAL
Qty: 90 TABLET | Refills: 1 | Status: SHIPPED | OUTPATIENT
Start: 2024-05-29

## 2024-05-29 NOTE — TELEPHONE ENCOUNTER
Refill Decision Note   Tony Carlos  is requesting a refill authorization.  Brief Assessment and Rationale for Refill:  Approve     Medication Therapy Plan:         Comments:     Note composed:5:37 AM 05/29/2024

## 2024-05-29 NOTE — TELEPHONE ENCOUNTER
No care due was identified.  Health [facility] Embedded Care Due Messages. Reference number: [id number].   5/29/2024 12:10:03 AM CDT

## 2024-06-07 ENCOUNTER — OFFICE VISIT (OUTPATIENT)
Dept: HOME HEALTH SERVICES | Facility: CLINIC | Age: 89
End: 2024-06-07
Payer: MEDICARE

## 2024-06-07 VITALS
WEIGHT: 127.19 LBS | OXYGEN SATURATION: 94 % | DIASTOLIC BLOOD PRESSURE: 63 MMHG | SYSTOLIC BLOOD PRESSURE: 111 MMHG | HEIGHT: 69 IN | RESPIRATION RATE: 18 BRPM | TEMPERATURE: 98 F | HEART RATE: 78 BPM | BODY MASS INDEX: 18.84 KG/M2

## 2024-06-07 DIAGNOSIS — E78.2 MIXED HYPERLIPIDEMIA: ICD-10-CM

## 2024-06-07 DIAGNOSIS — K21.9 GASTROESOPHAGEAL REFLUX DISEASE, UNSPECIFIED WHETHER ESOPHAGITIS PRESENT: ICD-10-CM

## 2024-06-07 DIAGNOSIS — Z00.00 ENCOUNTER FOR ANNUAL WELLNESS VISIT (AWV) IN MEDICARE PATIENT: Primary | ICD-10-CM

## 2024-06-07 DIAGNOSIS — I10 HYPERTENSION, UNSPECIFIED TYPE: ICD-10-CM

## 2024-06-07 DIAGNOSIS — I70.0 AORTIC ATHEROSCLEROSIS: ICD-10-CM

## 2024-06-07 DIAGNOSIS — J43.2 CENTRILOBULAR EMPHYSEMA: ICD-10-CM

## 2024-06-07 PROCEDURE — 1160F RVW MEDS BY RX/DR IN RCRD: CPT | Mod: CPTII,S$GLB,,

## 2024-06-07 PROCEDURE — 1101F PT FALLS ASSESS-DOCD LE1/YR: CPT | Mod: CPTII,S$GLB,,

## 2024-06-07 PROCEDURE — G0439 PPPS, SUBSEQ VISIT: HCPCS | Mod: S$GLB,,,

## 2024-06-07 PROCEDURE — 3288F FALL RISK ASSESSMENT DOCD: CPT | Mod: CPTII,S$GLB,,

## 2024-06-07 PROCEDURE — 1159F MED LIST DOCD IN RCRD: CPT | Mod: CPTII,S$GLB,,

## 2024-06-07 NOTE — PROGRESS NOTES
"  Tony Carlos presented for a  Medicare AWV and comprehensive Health Risk Assessment today. The following components were reviewed and updated:    Medical history  Family History  Social history  Allergies and Current Medications  Health Risk Assessment  Health Maintenance  Care Team     ** See Completed Assessments for Annual Wellness Visit within the encounter summary.**       The following assessments were completed:  Living Situation  CAGE  Depression Screening  Timed Get Up and Go  Whisper Test  Cognitive Function Screening    Nutrition Screening  ADL Screening  PAQ Screening    Patient medication list reviewed, patient is not taking prescription opioids. Patient is not using additional opioids than prescribed. Patient is at low risk of substance abuse based on this opioid use history.     Current Outpatient Medications on File Prior to Visit   Medication Sig Dispense Refill    albuterol (PROVENTIL/VENTOLIN HFA) 90 mcg/actuation inhaler Inhale 2 puffs into the lungs every 6 (six) hours as needed.      cyproheptadine (PERIACTIN) 4 mg tablet Take 4 mg by mouth every evening.      ipratropium (ATROVENT) 0.02 % nebulizer solution Take by nebulization.      pantoprazole (PROTONIX) 40 MG tablet Take 40 mg by mouth.      TRELEGY ELLIPTA 100-62.5-25 mcg DsDv Inhale 1 puff into the lungs.      valsartan (DIOVAN) 80 MG tablet TAKE 1 TABLET ONE TIME DAILY 90 tablet 1     No current facility-administered medications on file prior to visit.       Vitals:    06/07/24 1010   BP: 111/63   Pulse: 78   Resp: 18   Temp: 98.4 °F (36.9 °C)   SpO2: (!) 94%   Weight: 57.7 kg (127 lb 3.3 oz)   Height: 5' 9" (1.753 m)     Body mass index is 18.78 kg/m².  Physical Exam  Vitals reviewed.   Constitutional:       General: He is not in acute distress.     Appearance: Normal appearance. He is well-developed.   HENT:      Head: Normocephalic and atraumatic.      Nose: Nose normal.      Mouth/Throat:      Mouth: Mucous membranes are dry.      " Pharynx: Oropharynx is clear.   Eyes:      Pupils: Pupils are equal, round, and reactive to light.   Cardiovascular:      Rate and Rhythm: Normal rate and regular rhythm.      Pulses: Normal pulses.      Heart sounds: Normal heart sounds.   Pulmonary:      Effort: Pulmonary effort is normal.      Breath sounds: Normal breath sounds.   Abdominal:      General: Bowel sounds are normal.      Palpations: Abdomen is soft.   Musculoskeletal:         General: Normal range of motion.      Cervical back: Normal range of motion and neck supple.   Skin:     General: Skin is warm and dry.   Neurological:      General: No focal deficit present.      Mental Status: He is alert and oriented to person, place, and time. Mental status is at baseline.      Motor: Weakness present.      Gait: Gait abnormal.   Psychiatric:         Mood and Affect: Mood normal.         Behavior: Behavior normal.         Thought Content: Thought content normal.         Judgment: Judgment normal.               Diagnoses and health risks identified today and associated recommendations/orders:    1. Encounter for annual wellness visit (AWV) in Medicare patient  Annual wellness visit completed. Health maintenance reviewed. Will obtain tetanus vaccine.     2. Centrilobular emphysema  Chronic and stable. Continue current management with Trelegy. Albuterol/nebulizer as needed. See med list. Follow up with pulmonology.     3. Mixed hyperlipidemia  Chronic and stable with no medication. Continue current management. See med list. Follow up with PCP.     4. Hypertension, unspecified type  Chronic and stable on BP medication.  Continue current management with valsartan.  See med list above. Recommend low sodium diet. Follow up with PCP.       5. Gastroesophageal reflux disease, unspecified whether esophagitis present  Chronic and stable. Continue current management with pantoprazole. See med list. Follow up with PCP.       Cameron Jimenez with a 5-10 year written  screening schedule and personal prevention plan. Recommendations were developed using the USPSTF age appropriate recommendations. Education, counseling, and referrals were provided as needed. After Visit Summary printed and given to patient which includes a list of additional screenings\tests needed.    No follow-ups on file.    Xavi Mejia NP

## 2024-06-10 PROBLEM — I70.0 AORTIC ATHEROSCLEROSIS: Status: ACTIVE | Noted: 2024-06-10

## 2024-07-11 NOTE — TELEPHONE ENCOUNTER
Care Due:                  Date            Visit Type   Department     Provider  --------------------------------------------------------------------------------                                EP -                              PRIMARY      [facility]  Last Visit: 02-      CARE ([facility])   MEDICINE       Beatris Maria  Next Visit: None Scheduled  None         None Found                                                            Last  Test          Frequency    Reason                     Performed    Due Date  --------------------------------------------------------------------------------    CMP.........  12 months..  valsartan................  10-   10-    Health [facility] Embedded Care Due Messages. Reference number: [id number].   7/11/2024 3:20:59 PM CDT

## 2024-07-12 NOTE — TELEPHONE ENCOUNTER
Refill Routing Note   Medication(s) are not appropriate for processing by [facility] for the following reason(s):        Outside of protocol    [facility] action(s):  Route   Requires appointment : Yes   Requires labs : Yes             Appointments  past 12m or future 3m with PCP    Date Provider   Last Visit   2/5/2024 Beatris Maria MD   Next Visit   Visit date not found Beatris Maria MD   ED visits in past 90 days: 0        Note composed:7:51 PM 07/11/2024

## 2024-07-16 ENCOUNTER — TELEPHONE (OUTPATIENT)
Dept: FAMILY MEDICINE | Facility: CLINIC | Age: 89
End: 2024-07-16
Payer: MEDICARE

## 2024-07-16 RX ORDER — CYPROHEPTADINE HYDROCHLORIDE 4 MG/1
4 TABLET ORAL NIGHTLY
Qty: 90 TABLET | Refills: 0 | Status: SHIPPED | OUTPATIENT
Start: 2024-07-16

## 2024-07-16 NOTE — TELEPHONE ENCOUNTER
----- Message from Lynette Chawla sent at 7/16/2024  3:56 PM CDT -----  .Type:  RX Refill Request    Who Called: Tony  Refill or New Rx:Refill  RX Name and Strength:cyproheptadine (PERIACTIN) 4 mg tablet  How is the patient currently taking it? (ex. 1XDay):Take 1 tablet (4 mg total) by mouth every evening.  Is this a 30 day or 90 day RX:90  Phone: [phone] Fax: [phone]  [facility] [address]  [address] [address]  Phone: [phone] Fax: [phone]  Local or Mail Order:Mail Order  Ordering Provider:Beatris Maria  Would the patient rather a call back or a response via [facility]? Call Back  Best Call Back Number:.[phone]   Additional Information: Na      Thanks  LAYLA

## 2024-08-08 ENCOUNTER — OFFICE VISIT (OUTPATIENT)
Dept: FAMILY MEDICINE | Facility: CLINIC | Age: 89
End: 2024-08-08
Payer: MEDICARE

## 2024-08-08 VITALS
SYSTOLIC BLOOD PRESSURE: 122 MMHG | WEIGHT: 121.69 LBS | TEMPERATURE: 98 F | BODY MASS INDEX: 18.02 KG/M2 | DIASTOLIC BLOOD PRESSURE: 74 MMHG | HEIGHT: 69 IN | OXYGEN SATURATION: 93 %

## 2024-08-08 DIAGNOSIS — K21.9 GASTROESOPHAGEAL REFLUX DISEASE, UNSPECIFIED WHETHER ESOPHAGITIS PRESENT: ICD-10-CM

## 2024-08-08 DIAGNOSIS — Z12.5 ENCOUNTER FOR SCREENING FOR MALIGNANT NEOPLASM OF PROSTATE: ICD-10-CM

## 2024-08-08 DIAGNOSIS — R79.9 ABNORMAL FINDING OF BLOOD CHEMISTRY, UNSPECIFIED: ICD-10-CM

## 2024-08-08 DIAGNOSIS — I10 HYPERTENSION, UNSPECIFIED TYPE: Primary | ICD-10-CM

## 2024-08-08 DIAGNOSIS — E78.2 MIXED HYPERLIPIDEMIA: ICD-10-CM

## 2024-08-08 PROCEDURE — 99999 PR PBB SHADOW E&M-EST. PATIENT-LVL IV: CPT | Mod: PBBFAC,HCNC,, | Performed by: NURSE PRACTITIONER

## 2024-08-08 PROCEDURE — 1159F MED LIST DOCD IN RCRD: CPT | Mod: HCNC,CPTII,S$GLB, | Performed by: NURSE PRACTITIONER

## 2024-08-08 PROCEDURE — 1101F PT FALLS ASSESS-DOCD LE1/YR: CPT | Mod: HCNC,CPTII,S$GLB, | Performed by: NURSE PRACTITIONER

## 2024-08-08 PROCEDURE — 99214 OFFICE O/P EST MOD 30 MIN: CPT | Mod: HCNC,S$GLB,, | Performed by: NURSE PRACTITIONER

## 2024-08-08 PROCEDURE — 1160F RVW MEDS BY RX/DR IN RCRD: CPT | Mod: HCNC,CPTII,S$GLB, | Performed by: NURSE PRACTITIONER

## 2024-08-08 PROCEDURE — 1126F AMNT PAIN NOTED NONE PRSNT: CPT | Mod: HCNC,CPTII,S$GLB, | Performed by: NURSE PRACTITIONER

## 2024-08-08 PROCEDURE — 3288F FALL RISK ASSESSMENT DOCD: CPT | Mod: HCNC,CPTII,S$GLB, | Performed by: NURSE PRACTITIONER

## 2024-08-08 RX ORDER — PANTOPRAZOLE SODIUM 40 MG/1
40 TABLET, DELAYED RELEASE ORAL DAILY
Qty: 90 TABLET | Refills: 3 | Status: SHIPPED | OUTPATIENT
Start: 2024-08-08

## 2024-08-08 NOTE — PROGRESS NOTES
Tony Carlos  2024  [id number]    Beatris Maria MD  Patient Care Team:  Beatris Maria MD as PCP - General (Family Medicine)          Visit Type:a scheduled routine follow-up visit    Chief Complaint:  Chief Complaint   Patient presents with    Annual Exam       History of Present Illness:  Mr Carlos presents for 6 month follow up and repeat labs.   Reports acid reflux  No medication refills being requested at this time.  No other complaints at this time.        History:  Past Medical History:   Diagnosis Date    Centrilobular emphysema     GERD (gastroesophageal reflux disease)     Hypertension     Mixed hyperlipidemia      Past Surgical History:   Procedure Laterality Date    FOOT SURGERY Right     s/p fracture     Family History   Problem Relation Name Age of Onset    Emphysema Mother          smoker    Emphysema Father          smoker    No Known Problems Sister      No Known Problems Sister      No Known Problems Brother      No Known Problems Son      No Known Problems Son      No Known Problems Son      No Known Problems Daughter      No Known Problems Daughter       Social History     Socioeconomic History    Marital status:    Tobacco Use    Smoking status: Former     Current packs/day: 0.00     Types: Cigarettes     Quit date:      Years since quittin.6    Smokeless tobacco: Never   Substance and Sexual Activity    Alcohol use: Never    Drug use: Never    Sexual activity: Not Currently     Partners: Female   Social History Narrative    Retired  (including ).     Social Determinants of Health     Financial Resource Strain: Low Risk  (2024)    Overall Financial Resource Strain (CARDIA)     Difficulty of Paying Living Expenses: Not hard at all   Food Insecurity: No Food Insecurity (2024)    Hunger Vital Sign     Worried About Running Out of Food in the Last Year: Never true     Ran Out of Food in the Last Year: Never true   Transportation Needs:  No Transportation Needs (6/7/2024)    PRAPARE - Transportation     Lack of Transportation (Medical): No     Lack of Transportation (Non-Medical): No   Physical Activity: Inactive (8/7/2024)    Exercise Vital Sign     Days of Exercise per Week: 0 days     Minutes of Exercise per Session: 10 min   Stress: No Stress Concern Present (8/7/2024)    [employer] [employer] - Occupational Stress Questionnaire     Feeling of Stress : Only a little   Housing Stability: Low Risk  (8/7/2024)    Housing Stability Vital Sign     Unable to Pay for Housing in the Last Year: No     Homeless in the Last Year: No     Patient Active Problem List   Diagnosis    Hypertension    Centrilobular emphysema    Mixed hyperlipidemia    Gastroesophageal reflux disease    Aortic atherosclerosis     Review of patient's allergies indicates:  No Known Allergies    The following were reviewed at this visit: active problem list, medication list, allergies, family history, social history, and health maintenance.    Medications:  Current Outpatient Medications on File Prior to Visit   Medication Sig Dispense Refill    albuterol (PROVENTIL/VENTOLIN HFA) 90 mcg/actuation inhaler Inhale 2 puffs into the lungs every 6 (six) hours as needed.      cyproheptadine (PERIACTIN) 4 mg tablet Take 1 tablet (4 mg total) by mouth every evening. 90 tablet 0    ipratropium (ATROVENT) 0.02 % nebulizer solution Take by nebulization.      TRELEGY ELLIPTA 100-62.5-25 mcg DsDv Inhale 1 puff into the lungs.      valsartan (DIOVAN) 80 MG tablet TAKE 1 TABLET ONE TIME DAILY 90 tablet 1    [DISCONTINUED] pantoprazole (PROTONIX) 40 MG tablet Take 40 mg by mouth.       No current facility-administered medications on file prior to visit.       Medications have been reviewed and reconciled with patient at this visit.  Barriers to medications reviewed with patient.    Adverse reactions to current medications reviewed with patient..    Over the counter medications  reviewed and reconciled with patient.    Exam:  Wt Readings from Last 3 Encounters:   08/08/24 55.2 kg (121 lb 11.1 oz)   06/07/24 57.7 kg (127 lb 3.3 oz)   02/05/24 57.7 kg (127 lb 3.3 oz)     Temp Readings from Last 3 Encounters:   08/08/24 98.2 °F (36.8 °C) (Tympanic)   06/07/24 98.4 °F (36.9 °C)   02/05/24 96.5 °F (35.8 °C) (Temporal)     BP Readings from Last 3 Encounters:   08/08/24 122/74   06/07/24 111/63   02/05/24 135/80     Pulse Readings from Last 3 Encounters:   06/07/24 78   02/05/24 65   10/20/23 99     Body mass index is 17.97 kg/m².      Review of Systems   Constitutional:  Negative for fever.   Respiratory:  Negative for cough, shortness of breath and wheezing.    Cardiovascular:  Negative for chest pain and palpitations.   Gastrointestinal:  Negative for nausea.   Neurological:  Negative for speech change, weakness and headaches.   All other systems reviewed and are negative.    Physical Exam  Vitals and nursing note reviewed.   Constitutional:       Appearance: Normal appearance. He is obese.   HENT:      Head: Normocephalic and atraumatic.      Right Ear: Tympanic membrane, ear canal and external ear normal.      Left Ear: Tympanic membrane, ear canal and external ear normal.      Nose: Nose normal.      Mouth/Throat:      Mouth: Mucous membranes are moist.      Pharynx: Oropharynx is clear.   Eyes:      Extraocular Movements: Extraocular movements intact.      Conjunctiva/sclera: Conjunctivae normal.      Pupils: Pupils are equal, round, and reactive to light.   Cardiovascular:      Rate and Rhythm: Normal rate and regular rhythm.      Pulses: Normal pulses.      Heart sounds: Normal heart sounds.   Pulmonary:      Effort: Pulmonary effort is normal.      Breath sounds: Normal breath sounds.   Abdominal:      General: Bowel sounds are normal.      Palpations: Abdomen is soft.   Musculoskeletal:         General: Normal range of motion.      Cervical back: Normal range of motion and neck supple.    Skin:     General: Skin is warm and dry.      Capillary Refill: Capillary refill takes less than 2 seconds.   Neurological:      General: No focal deficit present.      Mental Status: He is alert and oriented to person, place, and time.   Psychiatric:         Mood and Affect: Mood normal.         Behavior: Behavior normal.         Thought Content: Thought content normal.         Judgment: Judgment normal.         Laboratory Reviewed ({Yes)  Lab Results   Component Value Date    WBC 5.84 10/06/2023    HGB 13.6 (L) 10/06/2023    HCT 45.1 10/06/2023     10/06/2023    CHOL 175 10/06/2023    TRIG 48 10/06/2023    HDL 59 10/06/2023    ALT 9 (L) 10/06/2023    AST 20 10/06/2023     10/06/2023    K 3.8 10/06/2023     10/06/2023    CREATININE 0.9 10/06/2023    BUN 16 10/06/2023    CO2 27 10/06/2023    TSH 1.290 10/06/2023       Tony was seen today for annual exam.    Diagnoses and all orders for this visit:    Hypertension, unspecified type  -     Hemoglobin A1C; Standing  -     Comprehensive Metabolic Panel; Standing  -     CBC Auto Differential; Standing  -     PSA, SCREENING; Standing    Mixed hyperlipidemia  -     Lipid Panel; Standing  -     T4, Free; Standing  -     TSH; Standing    Gastroesophageal reflux disease, unspecified whether esophagitis present  -     pantoprazole (PROTONIX) 40 MG tablet; Take 1 tablet (40 mg total) by mouth once daily.    Abnormal finding of blood chemistry, unspecified  -     Hemoglobin A1C; Standing    Encounter for screening for malignant neoplasm of prostate  -     PSA, SCREENING; Standing      Plan   Labs   The current medical regimen is effective;  continue present plan and medications.   Start protonix           Care Plan/Goals: Reviewed    Goals    None       Tony was seen today for annual exam.    Diagnoses and all orders for this visit:    Hypertension, unspecified type  -     Hemoglobin A1C; Standing  -     Comprehensive Metabolic Panel; Standing  -      CBC Auto Differential; Standing  -     PSA, SCREENING; Standing    Mixed hyperlipidemia  -     Lipid Panel; Standing  -     T4, Free; Standing  -     TSH; Standing    Gastroesophageal reflux disease, unspecified whether esophagitis present  -     pantoprazole (PROTONIX) 40 MG tablet; Take 1 tablet (40 mg total) by mouth once daily.    Abnormal finding of blood chemistry, unspecified  -     Hemoglobin A1C; Standing    Encounter for screening for malignant neoplasm of prostate  -     PSA, SCREENING; Standing       Follow up: Follow up for with  for 6 month follow up.    After visit summary was printed and given to patient upon discharge today.  Patient goals and care plan are included in After Visit Summary.

## 2024-08-09 ENCOUNTER — LAB VISIT (OUTPATIENT)
Dept: LAB | Facility: HOSPITAL | Age: 89
End: 2024-08-09
Attending: FAMILY MEDICINE
Payer: MEDICARE

## 2024-08-09 DIAGNOSIS — E78.2 MIXED HYPERLIPIDEMIA: ICD-10-CM

## 2024-08-09 DIAGNOSIS — R79.9 ABNORMAL FINDING OF BLOOD CHEMISTRY, UNSPECIFIED: ICD-10-CM

## 2024-08-09 DIAGNOSIS — Z12.5 ENCOUNTER FOR SCREENING FOR MALIGNANT NEOPLASM OF PROSTATE: ICD-10-CM

## 2024-08-09 DIAGNOSIS — I10 HYPERTENSION, UNSPECIFIED TYPE: ICD-10-CM

## 2024-08-09 LAB
ALBUMIN SERPL BCP-MCNC: 2.1 G/DL (ref 3.5–5.2)
ALP SERPL-CCNC: 49 U/L (ref 55–135)
ALT SERPL W/O P-5'-P-CCNC: 16 U/L (ref 10–44)
ANION GAP SERPL CALC-SCNC: 9 MMOL/L (ref 8–16)
AST SERPL-CCNC: 18 U/L (ref 10–40)
BASOPHILS # BLD AUTO: 0.01 K/UL (ref 0–0.2)
BASOPHILS NFR BLD: 0.1 % (ref 0–1.9)
BILIRUB SERPL-MCNC: 0.3 MG/DL (ref 0.1–1)
BUN SERPL-MCNC: 31 MG/DL (ref 8–23)
CALCIUM SERPL-MCNC: 8.6 MG/DL (ref 8.7–10.5)
CHLORIDE SERPL-SCNC: 110 MMOL/L (ref 95–110)
CHOLEST SERPL-MCNC: 144 MG/DL (ref 120–199)
CHOLEST/HDLC SERPL: 3 {RATIO} (ref 2–5)
CO2 SERPL-SCNC: 23 MMOL/L (ref 23–29)
COMPLEXED PSA SERPL-MCNC: 4.5 NG/ML (ref 0–4)
CREAT SERPL-MCNC: 1.3 MG/DL (ref 0.5–1.4)
DIFFERENTIAL METHOD BLD: ABNORMAL
EOSINOPHIL # BLD AUTO: 0 K/UL (ref 0–0.5)
EOSINOPHIL NFR BLD: 0 % (ref 0–8)
ERYTHROCYTE [DISTWIDTH] IN BLOOD BY AUTOMATED COUNT: 15.1 % (ref 11.5–14.5)
EST. GFR  (NO RACE VARIABLE): 52.8 ML/MIN/1.73 M^2
ESTIMATED AVG GLUCOSE: 111 MG/DL (ref 68–131)
GLUCOSE SERPL-MCNC: 149 MG/DL (ref 70–110)
HBA1C MFR BLD: 5.5 % (ref 4–5.6)
HCT VFR BLD AUTO: 31.2 % (ref 40–54)
HDLC SERPL-MCNC: 48 MG/DL (ref 40–75)
HDLC SERPL: 33.3 % (ref 20–50)
HGB BLD-MCNC: 8.9 G/DL (ref 14–18)
IMM GRANULOCYTES # BLD AUTO: 0.04 K/UL (ref 0–0.04)
IMM GRANULOCYTES NFR BLD AUTO: 0.4 % (ref 0–0.5)
LDLC SERPL CALC-MCNC: 85.4 MG/DL (ref 63–159)
LYMPHOCYTES # BLD AUTO: 1.1 K/UL (ref 1–4.8)
LYMPHOCYTES NFR BLD: 11.1 % (ref 18–48)
MCH RBC QN AUTO: 25.9 PG (ref 27–31)
MCHC RBC AUTO-ENTMCNC: 28.5 G/DL (ref 32–36)
MCV RBC AUTO: 91 FL (ref 82–98)
MONOCYTES # BLD AUTO: 0.3 K/UL (ref 0.3–1)
MONOCYTES NFR BLD: 2.7 % (ref 4–15)
NEUTROPHILS # BLD AUTO: 8.5 K/UL (ref 1.8–7.7)
NEUTROPHILS NFR BLD: 85.7 % (ref 38–73)
NONHDLC SERPL-MCNC: 96 MG/DL
NRBC BLD-RTO: 0 /100 WBC
PLATELET # BLD AUTO: 424 K/UL (ref 150–450)
PMV BLD AUTO: 9.9 FL (ref 9.2–12.9)
POTASSIUM SERPL-SCNC: 5.1 MMOL/L (ref 3.5–5.1)
PROT SERPL-MCNC: 5.1 G/DL (ref 6–8.4)
RBC # BLD AUTO: 3.44 M/UL (ref 4.6–6.2)
SODIUM SERPL-SCNC: 142 MMOL/L (ref 136–145)
T4 FREE SERPL-MCNC: 0.85 NG/DL (ref 0.71–1.51)
TRIGL SERPL-MCNC: 53 MG/DL (ref 30–150)
TSH SERPL DL<=0.005 MIU/L-ACNC: 0.79 UIU/ML (ref 0.4–4)
WBC # BLD AUTO: 9.94 K/UL (ref 3.9–12.7)

## 2024-08-09 PROCEDURE — 85025 COMPLETE CBC W/AUTO DIFF WBC: CPT | Mod: HCNC | Performed by: NURSE PRACTITIONER

## 2024-08-09 PROCEDURE — 84443 ASSAY THYROID STIM HORMONE: CPT | Mod: HCNC | Performed by: NURSE PRACTITIONER

## 2024-08-09 PROCEDURE — 80061 LIPID PANEL: CPT | Mod: HCNC | Performed by: NURSE PRACTITIONER

## 2024-08-09 PROCEDURE — 84153 ASSAY OF PSA TOTAL: CPT | Mod: HCNC | Performed by: NURSE PRACTITIONER

## 2024-08-09 PROCEDURE — 83036 HEMOGLOBIN GLYCOSYLATED A1C: CPT | Mod: HCNC | Performed by: NURSE PRACTITIONER

## 2024-08-09 PROCEDURE — 36415 COLL VENOUS BLD VENIPUNCTURE: CPT | Mod: HCNC,PO | Performed by: NURSE PRACTITIONER

## 2024-08-09 PROCEDURE — 80053 COMPREHEN METABOLIC PANEL: CPT | Mod: HCNC | Performed by: NURSE PRACTITIONER

## 2024-08-09 PROCEDURE — 84439 ASSAY OF FREE THYROXINE: CPT | Mod: HCNC | Performed by: NURSE PRACTITIONER

## 2024-08-15 ENCOUNTER — TELEPHONE (OUTPATIENT)
Dept: FAMILY MEDICINE | Facility: CLINIC | Age: 89
End: 2024-08-15
Payer: MEDICARE

## 2024-08-15 ENCOUNTER — PATIENT MESSAGE (OUTPATIENT)
Dept: FAMILY MEDICINE | Facility: CLINIC | Age: 89
End: 2024-08-15
Payer: MEDICARE

## 2024-08-15 DIAGNOSIS — D64.9 ANEMIA, UNSPECIFIED TYPE: Primary | ICD-10-CM
